# Patient Record
Sex: FEMALE | Race: WHITE | NOT HISPANIC OR LATINO
[De-identification: names, ages, dates, MRNs, and addresses within clinical notes are randomized per-mention and may not be internally consistent; named-entity substitution may affect disease eponyms.]

---

## 2021-02-08 ENCOUNTER — DASHBOARD ENCOUNTERS (OUTPATIENT)
Age: 41
End: 2021-02-08

## 2021-02-08 ENCOUNTER — OFFICE VISIT (OUTPATIENT)
Dept: URBAN - METROPOLITAN AREA CLINIC 96 | Facility: CLINIC | Age: 41
End: 2021-02-08
Payer: COMMERCIAL

## 2021-02-08 DIAGNOSIS — K59.00 CONSTIPATION, UNSPECIFIED CONSTIPATION TYPE: ICD-10-CM

## 2021-02-08 DIAGNOSIS — R14.0 BLOATING: ICD-10-CM

## 2021-02-08 DIAGNOSIS — R10.84 GENERALIZED ABDOMINAL PAIN: ICD-10-CM

## 2021-02-08 DIAGNOSIS — R11.2 NAUSEA AND VOMITING, INTRACTABILITY OF VOMITING NOT SPECIFIED, UNSPECIFIED VOMITING TYPE: ICD-10-CM

## 2021-02-08 DIAGNOSIS — K31.84 GASTROPARESIS: ICD-10-CM

## 2021-02-08 DIAGNOSIS — K58.2 IRRITABLE BOWEL SYNDROME WITH BOTH CONSTIPATION AND DIARRHEA: ICD-10-CM

## 2021-02-08 DIAGNOSIS — R19.7 DIARRHEA, UNSPECIFIED TYPE: ICD-10-CM

## 2021-02-08 PROBLEM — 14760008: Status: ACTIVE | Noted: 2021-02-08

## 2021-02-08 PROBLEM — 235675006: Status: ACTIVE | Noted: 2021-02-08

## 2021-02-08 PROBLEM — 10743008: Status: ACTIVE | Noted: 2021-02-08

## 2021-02-08 PROCEDURE — G9903 PT SCRN TBCO ID AS NON USER: HCPCS | Performed by: INTERNAL MEDICINE

## 2021-02-08 PROCEDURE — G8427 DOCREV CUR MEDS BY ELIG CLIN: HCPCS | Performed by: INTERNAL MEDICINE

## 2021-02-08 PROCEDURE — 99204 OFFICE O/P NEW MOD 45 MIN: CPT | Performed by: INTERNAL MEDICINE

## 2021-02-08 PROCEDURE — G8420 CALC BMI NORM PARAMETERS: HCPCS | Performed by: INTERNAL MEDICINE

## 2021-02-08 PROCEDURE — G8483 FLU IMM NO ADMIN DOC REA: HCPCS | Performed by: INTERNAL MEDICINE

## 2021-02-08 RX ORDER — PROMETHAZINE HYDROCHLORIDE 25 MG/1
1 TABLET AS NEEDED TABLET ORAL
Status: ACTIVE | COMMUNITY

## 2021-02-08 RX ORDER — TOPIRAMATE 50 MG
1 TABLET TABLET ORAL ONCE A DAY
Status: ACTIVE | COMMUNITY

## 2021-02-08 RX ORDER — ZOLMITRIPTAN 5 MG
1 TABLET TABLET ORAL ONCE A DAY
Status: ACTIVE | COMMUNITY

## 2021-02-08 RX ORDER — AZITHROMYCIN 200 MG/5ML
15 ML POWDER, FOR SUSPENSION ORAL
Status: ACTIVE | COMMUNITY

## 2021-02-08 RX ORDER — MONTELUKAST SODIUM 10 MG/1
1 TABLET TABLET, FILM COATED ORAL ONCE A DAY
Status: ACTIVE | COMMUNITY

## 2021-02-08 RX ORDER — PANTOPRAZOLE SODIUM 40 MG/1
1 TABLET TABLET, DELAYED RELEASE ORAL ONCE A DAY
Status: ACTIVE | COMMUNITY

## 2021-02-08 NOTE — PHYSICAL EXAM CONSTITUTIONAL:
well developed, flat affect, tearful. in no acute distress , ambulating without difficulty , normal communication ability

## 2021-02-08 NOTE — HPI-OTHER HISTORIES
49 yo female remotely seen for abdominal pain and diarrhea. EGD and colonoscopy done 7/29/2011 with normal duodenal bx, gastric bx negative for H pylori, normal TI and colon bx. 9/21/2011 with prolonged t1/2 GES 97 minutes. CT 8/1/2011 unremarkable. HBT negative for bacterial overgrowth 8/26/2011.  Now present for "digestive issues". Friend present in room for support per patient request.   She reports has been managing her gastroparesis via diet. Recovered from COVID diagnosed 12/29/2020, no inpatient stay required. Has not returned to prior baseline.  Now reports bloating, n/v, abdominal pain, diarrhea and constipation. No melena, no rectal bleeding, lost 21# in 5 weeks. Was seen by primary last week, no recent labs in 1 year. No dysphagia, no odynophagia. Early satiety and bloating. No alcohol. No tobacco, no MJ.

## 2021-02-09 ENCOUNTER — TELEPHONE ENCOUNTER (OUTPATIENT)
Dept: URBAN - METROPOLITAN AREA CLINIC 92 | Facility: CLINIC | Age: 41
End: 2021-02-09

## 2021-02-09 LAB
A/G RATIO: 2.2
ALBUMIN: 4.7
ALKALINE PHOSPHATASE: 65
ALT (SGPT): 63
AST (SGOT): 53
BASO (ABSOLUTE): 0
BASOS: 0
BILIRUBIN, TOTAL: 1.7
BUN/CREATININE RATIO: 21
BUN: 19
CALCIUM: 9.7
CARBON DIOXIDE, TOTAL: 20
CHLORIDE: 108
CREATININE: 0.89
EGFR IF AFRICN AM: 94
EGFR IF NONAFRICN AM: 81
EOS (ABSOLUTE): 0.1
EOS: 1
GLOBULIN, TOTAL: 2.1
GLUCOSE: 79
HEMATOCRIT: 44.2
HEMATOLOGY COMMENTS:: (no result)
HEMOGLOBIN: 14.2
IMMATURE CELLS: (no result)
IMMATURE GRANS (ABS): 0
IMMATURE GRANULOCYTES: 0
LYMPHS (ABSOLUTE): 2
LYMPHS: 38
MCH: 29.8
MCHC: 32.1
MCV: 93
MONOCYTES(ABSOLUTE): 0.4
MONOCYTES: 8
NEUTROPHILS (ABSOLUTE): 2.8
NEUTROPHILS: 53
NRBC: (no result)
PLATELETS: 235
POTASSIUM: 3.9
PROTEIN, TOTAL: 6.8
RBC: 4.77
RDW: 13.3
SODIUM: 142
WBC: 5.2

## 2021-02-10 ENCOUNTER — TELEPHONE ENCOUNTER (OUTPATIENT)
Dept: URBAN - METROPOLITAN AREA CLINIC 98 | Facility: CLINIC | Age: 41
End: 2021-02-10

## 2021-03-08 ENCOUNTER — OFFICE VISIT (OUTPATIENT)
Dept: URBAN - METROPOLITAN AREA CLINIC 96 | Facility: CLINIC | Age: 41
End: 2021-03-08

## 2021-12-02 NOTE — PHYSICAL EXAM CHEST:
no lesions,  no deformities,  no traumatic injuries,  no significant scars are present,  chest wall non-tender,  no masses present, breathing is unlabored without accessory muscle use, normal breath sounds RLE/weight-bearing as tolerated